# Patient Record
Sex: FEMALE | Race: WHITE | NOT HISPANIC OR LATINO | URBAN - METROPOLITAN AREA
[De-identification: names, ages, dates, MRNs, and addresses within clinical notes are randomized per-mention and may not be internally consistent; named-entity substitution may affect disease eponyms.]

---

## 2019-05-01 ENCOUNTER — OFFICE VISIT (OUTPATIENT)
Dept: UROLOGY | Facility: CLINIC | Age: 29
End: 2019-05-01
Attending: OBSTETRICS & GYNECOLOGY
Payer: COMMERCIAL

## 2019-05-01 VITALS — WEIGHT: 141.2 LBS | SYSTOLIC BLOOD PRESSURE: 125 MMHG | HEART RATE: 76 BPM | DIASTOLIC BLOOD PRESSURE: 89 MMHG

## 2019-05-01 DIAGNOSIS — N81.11 CYSTOCELE, MIDLINE: Primary | ICD-10-CM

## 2019-05-01 DIAGNOSIS — N81.6 RECTOCELE: ICD-10-CM

## 2019-05-01 DIAGNOSIS — N81.4 PROLAPSE OF UTERUS: ICD-10-CM

## 2019-05-01 RX ORDER — MV-MINS NO.50/IRON,CARB/FOLIC 29 MG-1 MG
1 TABLET ORAL DAILY
COMMUNITY
Start: 2018-06-15

## 2019-05-01 SDOH — HEALTH STABILITY: MENTAL HEALTH: HOW OFTEN DO YOU HAVE A DRINK CONTAINING ALCOHOL?: NEVER

## 2019-05-01 ASSESSMENT — PAIN SCALES - GENERAL: PAINLEVEL: NO PAIN (0)

## 2019-05-01 NOTE — PROGRESS NOTES
May 1, 2019    Referring Provider: MD LITO Zelaya [facility]  [address] [place] [address]    Primary Care Provider: System, Provider Not In    CC: post-partum prolapse    HPI:  Xin Ramirez is a 29 year old female who presents for evaluation of her pelvic floor symptoms.  She is seen at the request of Dr. Serena Rodriguez of [facility] for uterine prolapse. Se is 14 weeks post-partum from an  with a labilal laceration. No other tears or OB trauma. Son weighed 7 lbs. Shortly after delivery she noticed her vagina and her cervix were prolapse outside her vagina. Since that time she feels that it has reduced some what, although she still experiences vaginal bulge and pressure. Has been seeing PFPT. Denies AHSAN, UUI and FI.    Prolapse:  Do you feel a vaginal bulge? yes                                      Pressure? yes   Do you have to place your fingers in the vagina or in the rectum to have a bowel movement? no  Impact to quality of life? moderate     Stress Incontinence:  Do you leak urine with cough, sneeze, exercise? no  How often do you leak with cough, sneeze, exercise?  -  How much do you usually leak? -   Do you wear a pad? - If so; -  Impact to quality of life? -    Urge Incontinence:  Do you often get sudden urges to urinate? no  How often do have urges? -  If so, do you leak with these urges? -  How much do you usually leak? -  Impact to quality of life? -    Voids/day:5  Nocturia: 0-1  Fluid intake: 5  Caffeine: 1    Urinating:  Difficulty starting urination or strain to void? no  Weak or intermittent stream? no  Incomplete emptying or dribbling? no  Pain or burning with urination? no  Any blood in your urine? no    GI:  Constipation? no  Frequency stools daily    Straining for stools no  Stool consistency normal     Ever leak stool (Accidental Bowel Leakage)? no      If so, how often?               -      If so, do you leak?                   -      Soiling without sensation? -  History  of irritable bowel or Crohn's? no    Sexual/Pain:  Are you currently having sex?. yes  Pain with sex?   Most times   Sexual Partner: male  Do any of these symptoms interfere with sex? yes  Impact to quality of life? moderate    Prior therapy:  Ever done pelvic floor physical therapy? yes  Trial of medication? no  Have you ever tried a pessary? impressa    Medical History:  Do you have?   High Cholesterol? no     Diabetes? no  High Blood pressure? no     Recurrent UTIs? no  Sleep Apnea? no  Other medical problems: no    Surgical History:    Hysterectomy? no   Bladder Surgery? no   Other? appy     OB/Gyn History:  Pregnancies? 1  Deliveries? 1  Vaginal 1  Section 0  Current birth control? mirena  Periods? -  When was the first day of your last period? -  Last Pap smear? 2018 Any abnormal? no  Last mammogram? -  Last colonoscopy? -    Medications/Vitamins/Supplements: MVI    Drug Allergies: sulfa    Latex Allergy: no  Iodine Allergy no    Family History: (list relationship and age at diagnosis)  Breast cancer? no   Ovarian cancer? no   Colon cancer? no  Other? no    Social History:  Marital status:   Do you/ have you ever smoke(d)  cigarettes? no  Drink more than 1 alcoholic beverage a day?  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 no                   Unusual headaches? no  Leg pain w/ walking/exercise? no                       Easy bruising? no  Difficulty breathing w/ walking/exercise? no  Problems with vision? no  Dizziness, falls, or fainting? no  Excessive bleeding from cuts, gums, surgery? no  Other: no    No past medical history on file.    No past surgical history on file.    Social History     Socioeconomic History     Marital status:      Spouse name: Not on file     Number of children: Not on file     Years of education: Not on file     Highest education level: Not on file   Occupational History     Not on  file   Social Needs     Financial resource strain: Not on file     Food insecurity:     Worry: Not on file     Inability: Not on file     Transportation needs:     Medical: Not on file     Non-medical: Not on file   Tobacco Use     Smoking status: Not on file   Substance and Sexual Activity     Alcohol use: Not on file     Drug use: Not on file     Sexual activity: Not on file   Lifestyle     Physical activity:     Days per week: Not on file     Minutes per session: Not on file     Stress: Not on file   Relationships     Social connections:     Talks on phone: Not on file     Gets together: Not on file     Attends Tenriism service: Not on file     Active member of club or organization: Not on file     Attends meetings of clubs or organizations: Not on file     Relationship status: Not on file     Intimate partner violence:     Fear of current or ex partner: Not on file     Emotionally abused: Not on file     Physically abused: Not on file     Forced sexual activity: Not on file   Other Topics Concern     Not on file   Social History Narrative     Not on file       No family history on file.    ROS    Allergies not on file    No current outpatient medications on file.     No current facility-administered medications for this visit.        There were no vitals taken for this visit. No LMP recorded. There is no height or weight on file to calculate BMI.  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   Abdomen is soft, non-tender, non-distended, no CVAT.    Normal external female genitalia. The urethra was normal appearing, NT, no masses.    She has a cystocele to the HR, a rectocele to 1 cm above the hymenal ring and uterine prolapse to -4cm support on supine strain.  Speculum and bimanual exam are remarkable for normal appearing cervix and vaginal mucosa. IUD string visible, uterus and adnexa without masses, NT      3/5 kegels.    Rectal exam with normal tone, no masses or  tenderness.    POPQ  Aa 0   Ba 0  C -4  D -6  GH 4  PB 3  TVL 10  Ap -1  Bp -1    neg SST  VOID 100 ml  PVR 25 mL in and out cath  Urine dip ND    A/P: Xin Ramirez is a 29 year old F with post-partum prolapse    Long d/w patient regarding diagnosis, and treatment options. Recommend pessary fitting. Discussed activity level. Soul be okay to resume running once we fit her with a pessary. Discussed future child bearing. Discussed that there is little data regarding prolapse and her future pregnancies. Recommend that she attempt pregnancy when she and her  are ready. Feel that she can attempt a trial of labor.    A total of 60 minutes were spent with the patient today, > 50% in counseling and coordination of care    Devon Harmon MD  Professor, OB/GYN  Urogynecologist    CC  Patient Care Team:  System, Provider Not In as PCP - General (Clinic)  Tessa Nunez, PhD LP (Psychology)  Devon Harmon MD as MD (OB/Gyn)  BIANCA CONN

## 2019-05-01 NOTE — LETTER
RE: Xin Ramirez  [address] [facility] [address]     Dear Colleague,    Thank you for referring your patient, Xin Ramirez, to the [facility] at [facility]. Please see a copy of my visit note below.    May 1, 2019    Referring Provider: MD LITO Zelaya [facility]  [address] [place] [address]    Primary Care Provider: System, Provider Not In    CC: post-partum prolapse    HPI:  Xin Ramirez is a 29 year old female who presents for evaluation of her pelvic floor symptoms.  She is seen at the request of Dr. Serena Rodriguez of [facility] for uterine prolapse. Se is 14 weeks post-partum from an  with a labilal laceration. No other tears or OB trauma. Son weighed 7 lbs. Shortly after delivery she noticed her vagina and her cervix were prolapse outside her vagina. Since that time she feels that it has reduced some what, although she still experiences vaginal bulge and pressure. Has been seeing PFPT. Denies AHSAN, UUI and FI.    Prolapse:  Do you feel a vaginal bulge? yes                                      Pressure? yes   Do you have to place your fingers in the vagina or in the rectum to have a bowel movement? no  Impact to quality of life? moderate     Stress Incontinence:  Do you leak urine with cough, sneeze, exercise? no  How often do you leak with cough, sneeze, exercise?  -  How much do you usually leak? -   Do you wear a pad? - If so; -  Impact to quality of life? -    Urge Incontinence:  Do you often get sudden urges to urinate? no  How often do have urges? -  If so, do you leak with these urges? -  How much do you usually leak? -  Impact to quality of life? -    Voids/day:5  Nocturia: 0-1  Fluid intake: 5  Caffeine: 1    Urinating:  Difficulty starting urination or strain to void? no  Weak or intermittent stream? no  Incomplete emptying or dribbling? no  Pain or burning with urination? no  Any blood in your  urine? no    GI:  Constipation? no  Frequency stools daily    Straining for stools no  Stool consistency normal     Ever leak stool (Accidental Bowel Leakage)? no      If so, how often?               -      If so, do you leak?                   -      Soiling without sensation? -  History of irritable bowel or Crohn's? no    Sexual/Pain:  Are you currently having sex?. yes  Pain with sex?   Most times   Sexual Partner: male  Do any of these symptoms interfere with sex? yes  Impact to quality of life? moderate    Prior therapy:  Ever done pelvic floor physical therapy? yes  Trial of medication? no  Have you ever tried a pessary? impressa    Medical History:  Do you have?   High Cholesterol? no     Diabetes? no  High Blood pressure? no     Recurrent UTIs? no  Sleep Apnea? no  Other medical problems: no    Surgical History:    Hysterectomy? no   Bladder Surgery? no   Other? appy     OB/Gyn History:  Pregnancies? 1  Deliveries? 1  Vaginal 1  Section 0  Current birth control? mirena  Periods? -  When was the first day of your last period? -  Last Pap smear? 2018 Any abnormal? no  Last mammogram? -  Last colonoscopy? -    Medications/Vitamins/Supplements: MVI    Drug Allergies: sulfa    Latex Allergy: no  Iodine Allergy no    Family History: (list relationship and age at diagnosis)  Breast cancer? no   Ovarian cancer? no   Colon cancer? no  Other? no    Social History:  Marital status:   Do you/ have you ever smoke(d)  cigarettes? no  Drink more than 1 alcoholic beverage a day?  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 no                   Unusual headaches? no  Leg pain w/ walking/exercise? no                       Easy bruising? no  Difficulty breathing w/ walking/exercise? no  Problems with vision? no  Dizziness, falls, or fainting? no  Excessive bleeding from cuts, gums, surgery? no  Other: no    No past medical history on  file.    No past surgical history on file.    Social History     Socioeconomic History     Marital status:      Spouse name: Not on file     Number of children: Not on file     Years of education: Not on file     Highest education level: Not on file   Occupational History     Not on file   Social Needs     Financial resource strain: Not on file     Food insecurity:     Worry: Not on file     Inability: Not on file     Transportation needs:     Medical: Not on file     Non-medical: Not on file   Tobacco Use     Smoking status: Not on file   Substance and Sexual Activity     Alcohol use: Not on file     Drug use: Not on file     Sexual activity: Not on file   Lifestyle     Physical activity:     Days per week: Not on file     Minutes per session: Not on file     Stress: Not on file   Relationships     Social connections:     Talks on phone: Not on file     Gets together: Not on file     Attends Orthodoxy service: Not on file     Active member of club or organization: Not on file     Attends meetings of clubs or organizations: Not on file     Relationship status: Not on file     Intimate partner violence:     Fear of current or ex partner: Not on file     Emotionally abused: Not on file     Physically abused: Not on file     Forced sexual activity: Not on file   Other Topics Concern     Not on file   Social History Narrative     Not on file     No family history on file.    Allergies not on file    No current outpatient medications on file.     No current facility-administered medications for this visit.        There were no vitals taken for this visit. No LMP recorded. There is no height or weight on file to calculate BMI.  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   Abdomen is soft, non-tender, non-distended, no CVAT.    Normal external female genitalia. The urethra was normal appearing, NT, no masses.    She has a cystocele to the HR, a rectocele to 1 cm above the hymenal  ring and uterine prolapse to -4cm support on supine strain.  Speculum and bimanual exam are remarkable for normal appearing cervix and vaginal mucosa. IUD string visible, uterus and adnexa without masses, NT      3/5 kegels.    Rectal exam with normal tone, no masses or tenderness.    POPQ  Aa 0   Ba 0  C -4  D -6  GH 4  PB 3  TVL 10  Ap -1  Bp -1    neg SST  VOID 100 ml  PVR 25 mL in and out cath  Urine dip ND    A/P: Xin Ramirez is a 29 year old F with post-partum prolapse    Long d/w patient regarding diagnosis, and treatment options. Recommend pessary fitting. Discussed activity level. Soul be okay to resume running once we fit her with a pessary. Discussed future child bearing. Discussed that there is little data regarding prolapse and her future pregnancies. Recommend that she attempt pregnancy when she and her  are ready. Feel that she can attempt a trial of labor.    A total of 60 minutes were spent with the patient today, > 50% in counseling and coordination of care    Devon Harmon MD  Professor, OB/GYN  Urogynecologist    CC  Patient Care Team:  System, Provider Not In as PCP - General (Clinic)  Tessa Nunez, PhD LP (Psychology)  Devon Harmon MD as MD (OB/Gyn)  BIANCA CONN

## 2019-05-02 ENCOUNTER — OFFICE VISIT (OUTPATIENT)
Dept: UROLOGY | Facility: CLINIC | Age: 29
End: 2019-05-02
Attending: OBSTETRICS & GYNECOLOGY
Payer: COMMERCIAL

## 2019-05-02 VITALS — DIASTOLIC BLOOD PRESSURE: 84 MMHG | SYSTOLIC BLOOD PRESSURE: 125 MMHG | WEIGHT: 141 LBS

## 2019-05-02 DIAGNOSIS — N81.6 RECTOCELE: ICD-10-CM

## 2019-05-02 DIAGNOSIS — N81.11 CYSTOCELE, MIDLINE: Primary | ICD-10-CM

## 2019-05-02 DIAGNOSIS — N81.4 PROLAPSE OF UTERUS: ICD-10-CM

## 2019-05-02 PROCEDURE — 57160 INSERT PESSARY/OTHER DEVICE: CPT | Mod: ZF | Performed by: OBSTETRICS & GYNECOLOGY

## 2019-05-02 PROCEDURE — G0463 HOSPITAL OUTPT CLINIC VISIT: HCPCS | Mod: ZF

## 2019-05-02 ASSESSMENT — PAIN SCALES - GENERAL: PAINLEVEL: NO PAIN (0)

## 2019-05-02 NOTE — LETTER
RE: Xin Ramirez  [address] [place] [id number]     Dear Colleague,    Thank you for referring your patient, Xin Ramirez, to the [facility] at [facility]. Please see a copy of my visit note below.    May 2, 2019    Return visit    30 yo F with post partum prolapse seen in clinic yesterday. Patient returns today for pessary fitting.    There were no vitals taken for this visit.  She is comfortable, in no distress, non-labored breathing.  Abdomen is soft, non-tender, non-distended.  Normal external female genitalia.  Speculum and bimanual exam are remarkable for mild vaginal atrophy and the previously noted prolapse.    Pt was fit with a #5 ring with support. Demonstrated ability to insert and remove without difficulty. Advised to insert and remove pessary daily. F/U in 2 weeks or sooner PRN bleeding, discharge or pain.    A/P: 29 year old F with cystocele, rectocele and uterine prolapse. Fit with #5 ring with support    Will also give compounded estrogen cream for mild vaginal atrophy    A total of 25 minutes were spent with the patient today, > 50% in counseling and coordination of care    Devon Harmon MD  Professor, OB/GYN  Urogynecologist    CC  Patient Care Team:  System, Provider Not In as PCP - General (Clinic)  Tessa Nunez, PhD LP (Psychology)  Devon Harmon MD as MD (OB/Gyn)  SELF, REFERRED

## 2019-05-02 NOTE — PROGRESS NOTES
May 2, 2019    Return visit    30 yo F with post partum prolapse seen in clinic yesterday. Patient returns today for pessary fitting.    There were no vitals taken for this visit.  She is comfortable, in no distress, non-labored breathing.  Abdomen is soft, non-tender, non-distended.  Normal external female genitalia.  Speculum and bimanual exam are remarkable for mild vaginal atrophy and the previously noted prolapse.    Pt was fit with a #5 ring with support. Demonstrated ability to insert and remove without difficulty. Advised to insert and remove pessary daily. F/U in 2 weeks or sooner PRN bleeding, discharge or pain.    A/P: 29 year old F with cystocele, rectocele and uterine prolapse. Fit with #5 ring with support    Will also give compounded estrogen cream for mild vaginal atrophy    A total of 25 minutes were spent with the patient today, > 50% in counseling and coordination of care    Devon Harmon MD  Professor, OB/GYN  Urogynecologist      CC  Patient Care Team:  System, Provider Not In as PCP - General (Clinic)  Tessa Nunez, PhD LP (Psychology)  Devon Harmon MD as MD (OB/Gyn)  SELF, REFERRED

## 2019-05-23 ENCOUNTER — OFFICE VISIT (OUTPATIENT)
Dept: UROLOGY | Facility: CLINIC | Age: 29
End: 2019-05-23
Attending: OBSTETRICS & GYNECOLOGY
Payer: COMMERCIAL

## 2019-05-23 VITALS — WEIGHT: 141 LBS | SYSTOLIC BLOOD PRESSURE: 118 MMHG | HEART RATE: 65 BPM | DIASTOLIC BLOOD PRESSURE: 80 MMHG

## 2019-05-23 DIAGNOSIS — N81.11 CYSTOCELE, MIDLINE: Primary | ICD-10-CM

## 2019-05-23 DIAGNOSIS — N89.8 VAGINAL DISCHARGE: ICD-10-CM

## 2019-05-23 LAB
CLUE CELLS: NORMAL
TRICHOMONAS (WET PREP): NORMAL
YEAST (WET PREP): NORMAL

## 2019-05-23 PROCEDURE — 87210 SMEAR WET MOUNT SALINE/INK: CPT | Mod: ZF | Performed by: OBSTETRICS & GYNECOLOGY

## 2019-05-23 PROCEDURE — G0463 HOSPITAL OUTPT CLINIC VISIT: HCPCS | Mod: 25

## 2019-05-23 PROCEDURE — 57160 INSERT PESSARY/OTHER DEVICE: CPT | Mod: ZF | Performed by: OBSTETRICS & GYNECOLOGY

## 2019-05-23 ASSESSMENT — PAIN SCALES - GENERAL: PAINLEVEL: NO PAIN (0)

## 2019-05-23 NOTE — LETTER
5/23/2019     RE: Xin Ramirez  [address] [facility] [address]     Dear Colleague,    Thank you for referring your patient, Xin Ramirez, to the [facility] at [facility]. Please see a copy of my visit note below.    CC: prolapse    Pt here for pessary check. Pt inserting and removing pessary daily most times. She will occ leave the pessary out on weekends when she is not working. She noted that she does feel increased pelvic pressure and discomfort when she does not wear the pessary. She does complain that the pessary will occ pinch and cause discomfort. Pt o/w happy with pessary, but was wondering if she could try a smaller size. Denies bleeding, spotting or discharge.    /80   Pulse 65   Wt 64 kg (141 lb)  No LMP recorded. (Menstrual status: IUD). There is no height or weight on file to calculate BMI.  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   Vagina: has a small amount of discharge, but o/w without lesion.    A/P: Xin Ramirez is a 29 year old F with cystocele    We fit her with a #4 ring with support today.    A total of 30 minutes were spent with the patient today, > 50% in counseling and coordination of care    Devon Harmon MD  Professor, OB/GYN  Urogynecologist  CC  Patient Care Team:  System, Provider Not In as PCP - General (Clinic)  Tessa Nunez, PhD LP (Psychology)  Devon Harmon MD as MD (OB/Gyn)  SELF, REFERRED

## 2019-05-23 NOTE — NURSING NOTE
Chief Complaint   Patient presents with     RECHECK     pesasry check   improving a little   Going to PT .     A little pain and thick lumpy discharge  Usually white.  #5 ring with support.

## 2019-05-23 NOTE — PROGRESS NOTES
CC: prolapse    Pt here for pessary check. Pt inserting and removing pessary daily most times. She will occ leave the pessary out on weekends when she is not working. She noted that she does feel increased pelvic pressure and discomfort when she does not wear the pessary. She does complain that the pessary will occ pinch and cause discomfort. Pt o/w happy with pessary, but was wondering if she could try a smaller size. Denies bleeding, spotting or discharge.    /80   Pulse 65   Wt 64 kg (141 lb)  No LMP recorded. (Menstrual status: IUD). There is no height or weight on file to calculate BMI.  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   Vagina: has a small amount of discharge, but o/w without lesion.    A/P: Xin Ramirez is a 29 year old F with cystocele    We fit her with a #4 ring with support today.    A total of 30 minutes were spent with the patient today, > 50% in counseling and coordination of care    Devon Harmon MD  Professor, OB/GYN  Urogynecologist  CC  Patient Care Team:  System, Provider Not In as PCP - General (Clinic)  Tessa Nunez, PhD LP (Psychology)  Devon Harmon MD as MD (OB/Gyn)  SELF, REFERRED

## 2019-11-27 ENCOUNTER — OFFICE VISIT (OUTPATIENT)
Dept: UROLOGY | Facility: CLINIC | Age: 29
End: 2019-11-27
Attending: OBSTETRICS & GYNECOLOGY
Payer: COMMERCIAL

## 2019-11-27 VITALS — DIASTOLIC BLOOD PRESSURE: 75 MMHG | HEART RATE: 77 BPM | WEIGHT: 127.7 LBS | SYSTOLIC BLOOD PRESSURE: 112 MMHG

## 2019-11-27 DIAGNOSIS — N81.11 CYSTOCELE, MIDLINE: Primary | ICD-10-CM

## 2019-11-27 DIAGNOSIS — N81.6 RECTOCELE: ICD-10-CM

## 2019-11-27 PROCEDURE — G0463 HOSPITAL OUTPT CLINIC VISIT: HCPCS | Mod: ZF

## 2019-11-27 ASSESSMENT — PAIN SCALES - GENERAL: PAINLEVEL: NO PAIN (0)

## 2019-11-27 NOTE — PROGRESS NOTES
2019    Return visit    Patient returns today for f/u of pessary use. She has been using her pessary intermittently since her last visit as she is mostly asymptomatic. Only occ pressure. No problems with intercourse. Has only occ attempted to run, but no symptoms with that either    /75   Pulse 77   Wt 57.9 kg (127 lb 11.2 oz)   She is comfortable, in no distress, non-labored breathing.  Abdomen is soft, non-tender, non-distended.  Normal external female genitalia.  Speculum and bimanual exam are remarkable for cystocele to 0 and rectocele to -1. Uterus well supported.      A/P: 29 year old F with cystocele    Pt advised to continue to us pessary as needed. No need to f/u with me unless she has questions or worsening of her prolapse. Would allow attempted  if pregnant in future.    A total of 15 minutes were spent with the patient today, > 50% in counseling and coordination of care    Devon Harmon MD  Professor, OB/GYN  Urogynecologist    CC  Patient Care Team:  Tessa Nunez, PhD LP (Psychology)  Devon Harmon MD as MD (OB/Gyn)  SELF, REFERRED

## 2019-11-27 NOTE — LETTER
2019       RE: Xin Ramirez  [address] [facility] [address]     Dear Colleague,    Thank you for referring your patient, Xin Ramirez, to the [facility] at [facility]. Please see a copy of my visit note below.    2019    Return visit    Patient returns today for f/u of pessary use. She has been using her pessary intermittently since her last visit as she is mostly asymptomatic. Only occ pressure. No problems with intercourse. Has only occ attempted to run, but no symptoms with that either    /75   Pulse 77   Wt 57.9 kg (127 lb 11.2 oz)   She is comfortable, in no distress, non-labored breathing.  Abdomen is soft, non-tender, non-distended.  Normal external female genitalia.  Speculum and bimanual exam are remarkable for cystocele to 0 and rectocele to -1. Uterus well supported.      A/P: 29 year old F with cystocele    Pt advised to continue to us pessary as needed. No need to f/u with me unless she has questions or worsening of her prolapse. Would allow attempted  if pregnant in future.    A total of 15 minutes were spent with the patient today, > 50% in counseling and coordination of care    Devon Harmon MD  Professor, OB/GYN  Urogynecologist    CC  Patient Care Team:  Tessa Nunez, PhD LP (Psychology)  Devon Harmon MD as MD (OB/Gyn)  SELF, REFERRED      Again, thank you for allowing me to participate in the care of your patient.      Sincerely,    Devon Harmon MD

## 2019-11-27 NOTE — LETTER
2019      RE: Xin Ramirez  [address] [facility] [address]       2019    Return visit    Patient returns today for f/u of pessary use. She has been using her pessary intermittently since her last visit as she is mostly asymptomatic. Only occ pressure. No problems with intercourse. Has only occ attempted to run, but no symptoms with that either    /75   Pulse 77   Wt 57.9 kg (127 lb 11.2 oz)   She is comfortable, in no distress, non-labored breathing.  Abdomen is soft, non-tender, non-distended.  Normal external female genitalia.  Speculum and bimanual exam are remarkable for cystocele to 0 and rectocele to -1. Uterus well supported.      A/P: 29 year old F with cystocele    Pt advised to continue to us pessary as needed. No need to f/u with me unless she has questions or worsening of her prolapse. Would allow attempted  if pregnant in future.    A total of 15 minutes were spent with the patient today, > 50% in counseling and coordination of care    Devon Harmon MD  Professor, OB/GYN  Urogynecologist    CC  Patient Care Team:  Tessa Nunez, PhD LP (Psychology)  Devon Harmon MD as MD (OB/Gyn)  SELF, REFERRED

## 2020-10-03 ENCOUNTER — AMBULATORY - HEALTHEAST (OUTPATIENT)
Dept: INTERNAL MEDICINE | Facility: CLINIC | Age: 30
End: 2020-10-03

## 2020-10-03 ENCOUNTER — VIRTUAL VISIT (OUTPATIENT)
Dept: FAMILY MEDICINE | Facility: OTHER | Age: 30
End: 2020-10-03
Payer: COMMERCIAL

## 2020-10-03 ENCOUNTER — AMBULATORY - HEALTHEAST (OUTPATIENT)
Dept: FAMILY MEDICINE | Facility: CLINIC | Age: 30
End: 2020-10-03

## 2020-10-03 DIAGNOSIS — Z20.822 SUSPECTED 2019 NOVEL CORONAVIRUS INFECTION: ICD-10-CM

## 2020-10-03 PROCEDURE — 99421 OL DIG E/M SVC 5-10 MIN: CPT | Performed by: FAMILY MEDICINE

## 2020-10-03 NOTE — PROGRESS NOTES
"Date: 10/03/2020 09:11:01  Clinician: Brice Nicole  Clinician NPI: [id number]  Patient: Xin Ramirez  Patient : 1990  Patient Address: [address] [place] [id number]  Patient Phone: [phone]  Visit Protocol: URI  Patient Summary:  Xin is a 30 year old ( : 1990 ) female who initiated a [facility] Visit for COVID-19 (Coronavirus) evaluation and screening. When asked the question \"Please sign me up to receive news, health information and promotions from [facility].\", Xin responded \"No\".    Xin states her symptoms started gradually 3-4 days ago.   Her symptoms consist of enlarged lymph nodes, a cough, nasal congestion, rhinitis, nausea, facial pain or pressure, myalgia, malaise, and a sore throat.   Symptom details     Nasal secretions: The color of her mucus is clear and yellow.    Cough: Xin coughs a few times an hour and her cough is more bothersome at night. Phlegm comes into her throat when she coughs. She believes her cough is caused by post-nasal drip. The color of the phlegm is clear and yellow.     Sore throat: Xin reports having mild throat pain (1-3 on a 10 point pain scale), does not have exudate on her tonsils, and can swallow liquids. The lymph nodes in her neck are enlarged. A rash has not appeared on the skin since the sore throat started.     Facial pain or pressure: The facial pain or pressure does not feel worse when bending or leaning forward.      Xin denies having ear pain, headache, wheezing, fever, anosmia, vomiting, chills, teeth pain, ageusia, and diarrhea. She also denies double sickening (worsening symptoms after initial improvement), having a sinus infection within the past year, taking antibiotic medication in the past month, and having recent facial or sinus surgery in the past 60 days. She is not experiencing dyspnea.   Precipitating events  Within the past week, Xin has not been exposed to someone with strep throat. She has not recently been exposed to " someone with influenza. Xin has been in close contact with the following high risk individuals: children under the age of 5.   Pertinent COVID-19 (Coronavirus) information  In the past 14 days, Xin has not worked in a congregate living setting.   She either works or volunteers as a healthcare worker or a , or works or volunteers in a healthcare facility. She provides direct patient care. Additional job details as reported by the patient (free text): Resident physician working in pediatrics at [employer]   Xin also has not lived in a congregate living setting in the past 14 days. She lives with a healthcare worker.   Xin has not had a close contact with a laboratory-confirmed COVID-19 patient within 14 days of symptom onset.   Since December 2019, Xin and has had upper respiratory infection (URI) or influenza-like illness. Has not been diagnosed with lab-confirmed COVID-19 test      Date(s) of previous URI or influenza-like illness (free-text): January 2020     Symptoms Xin experienced during previous URI or influenza-like illness as reported by the patient (free-text): Cough        Pertinent medical history  Xin does not get yeast infections when she takes antibiotics.   Xin needs a return to work/school note.   Weight: 130 lbs   Xin does not smoke or use smokeless tobacco.   She denies pregnancy and is breastfeeding. She does not menstruate.   Additional information as reported by the patient (free text): Toddler had cold symptoms the week prior and tested negative for COVID   Weight: 130 lbs    MEDICATIONS: No current medications, ALLERGIES: Sulfa (Sulfonamide Antibiotics)  Clinician Response:  Dear Xin,   Your symptoms show that you may have coronavirus (COVID-19). This illness can cause fever, cough and trouble breathing. Many people get a mild case and get better on their own. Some people can get very sick.  What should I do?  We would like to test you for this  "virus.   1. Please call [phone] to schedule your visit. Explain that you were [facility] to have a COVID-19 test. Be ready to share [facility].  The following will serve as your written order for this COVID Test, ordered by me, for the indication of suspected COVID [Z20.828]: The test will be ordered in [employer], our electronic health record, after you are scheduled. It will show as ordered and authorized by Keo Felton MD.  Order: COVID-19 (Coronavirus) PCR for SYMPTOMATIC testing from [facility].      2. When it's time for your COVID test:  Stay at least 6 feet away from others. (If someone will drive you to your test, stay in the backseat, as far away from the  as you can.)   Cover your mouth and nose with a mask, tissue or washcloth.  Go straight to the testing site. Don't make any stops on the way there or back.      3.Starting now: Stay home and away from others (self-isolate) until:   You've had no fever---and no medicine that reduces fever---for one full day (24 hours). And...   Your other symptoms have gotten better. For example, your cough or breathing has improved. And...   At least 10 days have passed since your symptoms started.       During this time, don't leave the house except for testing or medical care.   Stay in your own room, even for meals. Use your own bathroom if you can.   Stay away from others in your home. No hugging, kissing or shaking hands. No visitors.  Don't go to work, school or anywhere else.    Clean \"high touch\" surfaces often (doorknobs, counters, handles, etc.). Use a household cleaning spray or wipes. You'll find a full list of  on the EPA website: www.epa.gov/pesticide-registration/list-n-disinfectants-use-against-sars-cov-2.   Cover your mouth and nose with a mask, tissue or washcloth to avoid spreading germs.  Wash your hands and face often. Use soap and water.  Caregivers in these groups are at risk for severe illness due to COVID-19:  o " People 65 years and older  o People who live in a nursing home or long-term care facility  o People with chronic disease (lung, heart, cancer, diabetes, kidney, liver, immunologic)  o People who have a weakened immune system, including those who:   Are in cancer treatment  Take medicine that weakens the immune system, such as corticosteroids  Had a bone marrow or organ transplant  Have an immune deficiency  Have poorly controlled HIV or AIDS  Are obese (body mass index of 40 or higher)  Smoke regularly   o Caregivers should wear gloves while washing dishes, handling laundry and cleaning bedrooms and bathrooms.  o Use caution when washing and drying laundry: Don't shake dirty laundry, and use the warmest water setting that you can.  o For more tips, go to www.cdc.gov/coronavirus/2019-ncov/downloads/10Things.pdf.    4.Sign up for [email or web address]. We know it's scary to hear that you might have COVID-19. We want to track your symptoms to make sure you're okay over the next 2 weeks. Please look for an email from [email or web address]---this is a free, online program that we'll use to keep in touch. To sign up, follow the link in the email. Learn more at [email or web address]  How can I take care of myself?   Get lots of rest. Drink extra fluids (unless a doctor has told you not to).   Take Tylenol (acetaminophen) for fever or pain. If you have liver or kidney problems, ask your family doctor if it's okay to take Tylenol.   Adults can take either:    650 mg (two 325 mg pills) every 4 to 6 hours, or...   1,000 mg (two 500 mg pills) every 8 hours as needed.    Note: Don't take more than 3,000 mg in one day. Acetaminophen is found in many medicines (both prescribed and over-the-counter medicines). Read all labels to be sure you don't take too much.   For children, check the Tylenol bottle for the right dose. The dose is based on the child's age or weight.    If you have other health problems (like cancer, heart failure, an  organ transplant or severe kidney disease): Call your specialty clinic if you don't feel better in the next 2 days.       Know when to call 911. Emergency warning signs include:    Trouble breathing or shortness of breath Pain or pressure in the chest that doesn't go away Feeling confused like you haven't felt before, or not being able to wake up Bluish-colored lips or face.  Where can I get more information?   [facility] -- About COVID-19: [email or web address]   CDC -- What to Do If You're Sick: www.cdc.gov/coronavirus/2019-ncov/about/steps-when-sick.html   CDC -- Ending Home Isolation: www.cdc.gov/coronavirus/2019-ncov/hcp/disposition-in-home-patients.html   CDC -- Caring for Someone: www.cdc.gov/coronavirus/2019-ncov/if-you-are-sick/care-for-someone.html   [facility] -- Interim Guidance for Hospital Discharge to Home: www.[facility].mn./diseases/coronavirus/hcp/hospdischarge.pdf   [facility] clinical trials (COVID-19 research studies): clinicalaffairs.[facility]-clinical-trials    Below are the COVID-19 hotlines at the [facility] ([facility]). Interpreters are available.    For health questions: Call 970-691-6716 or 1-175.229.7518 (7 a.m. to 7 p.m.) For questions about schools and childcare: Call 698-645-0988 or 1-373.641.7482 (7 a.m. to 7 p.m.)    Diagnosis: Other malaise  Diagnosis ICD: R53.81

## 2020-10-05 ENCOUNTER — COMMUNICATION - HEALTHEAST (OUTPATIENT)
Dept: SCHEDULING | Facility: CLINIC | Age: 30
End: 2020-10-05

## 2021-08-08 ENCOUNTER — HEALTH MAINTENANCE LETTER (OUTPATIENT)
Age: 31
End: 2021-08-08

## 2021-10-03 ENCOUNTER — HEALTH MAINTENANCE LETTER (OUTPATIENT)
Age: 31
End: 2021-10-03

## 2022-09-11 ENCOUNTER — HEALTH MAINTENANCE LETTER (OUTPATIENT)
Age: 32
End: 2022-09-11

## 2022-10-06 ENCOUNTER — HOSPITAL ENCOUNTER (OUTPATIENT)
Facility: CLINIC | Age: 32
Discharge: HOME | End: 2022-10-06
Attending: OBSTETRICS & GYNECOLOGY | Admitting: OBSTETRICS & GYNECOLOGY
Payer: COMMERCIAL

## 2022-10-06 ENCOUNTER — HOSPITAL ENCOUNTER (OUTPATIENT)
Facility: CLINIC | Age: 32
End: 2022-10-06
Admitting: OBSTETRICS & GYNECOLOGY
Payer: COMMERCIAL

## 2022-10-06 VITALS
HEIGHT: 67 IN | WEIGHT: 160 LBS | DIASTOLIC BLOOD PRESSURE: 68 MMHG | BODY MASS INDEX: 25.11 KG/M2 | RESPIRATION RATE: 16 BRPM | SYSTOLIC BLOOD PRESSURE: 112 MMHG | TEMPERATURE: 98 F | HEART RATE: 74 BPM

## 2022-10-06 LAB
ALBUMIN UR-MCNC: NEGATIVE MG/DL
APPEARANCE UR: CLEAR
BILIRUB UR QL STRIP: NEGATIVE
CLUE CELLS: ABNORMAL
COLOR UR AUTO: NORMAL
FFN SPECIMEN INTEGRITY: NORMAL
FIBRONECTIN FETAL VAG QL: NEGATIVE
GLUCOSE UR STRIP-MCNC: NEGATIVE MG/DL
HGB UR QL STRIP: NEGATIVE
KETONES UR STRIP-MCNC: NEGATIVE MG/DL
LEUKOCYTE ESTERASE UR QL STRIP: NEGATIVE
NITRATE UR QL: NEGATIVE
PH UR STRIP: 6.5 [PH] (ref 5–7)
SP GR UR STRIP: 1.01 (ref 1–1.03)
TRICHOMONAS, WET PREP: ABNORMAL
UROBILINOGEN UR STRIP-MCNC: NORMAL MG/DL
WBC'S/HIGH POWER FIELD, WET PREP: ABNORMAL
YEAST, WET PREP: ABNORMAL

## 2022-10-06 PROCEDURE — 87210 SMEAR WET MOUNT SALINE/INK: CPT

## 2022-10-06 PROCEDURE — 82731 ASSAY OF FETAL FIBRONECTIN: CPT

## 2022-10-06 PROCEDURE — 81003 URINALYSIS AUTO W/O SCOPE: CPT

## 2022-10-06 PROCEDURE — 99214 OFFICE O/P EST MOD 30 MIN: CPT | Mod: 25 | Performed by: OBSTETRICS & GYNECOLOGY

## 2022-10-06 PROCEDURE — 59025 FETAL NON-STRESS TEST: CPT | Mod: 26 | Performed by: OBSTETRICS & GYNECOLOGY

## 2022-10-06 PROCEDURE — G0463 HOSPITAL OUTPT CLINIC VISIT: HCPCS

## 2022-10-06 PROCEDURE — 87591 N.GONORRHOEAE DNA AMP PROB: CPT

## 2022-10-06 PROCEDURE — 87491 CHLMYD TRACH DNA AMP PROBE: CPT

## 2022-10-06 RX ORDER — LIDOCAINE 40 MG/G
CREAM TOPICAL
Status: DISCONTINUED | OUTPATIENT
Start: 2022-10-06 | End: 2022-10-06 | Stop reason: HOSPADM

## 2022-10-06 ASSESSMENT — ACTIVITIES OF DAILY LIVING (ADL)
ADLS_ACUITY_SCORE: 20

## 2022-10-06 NOTE — PLAN OF CARE
No cervical change and pt ready for discharge. Has appointment with primary provider tomorrow. Discharge instructions given. Pt discharged to home and self care.

## 2022-10-06 NOTE — PROVIDER NOTIFICATION
Dr Angel to bedside for SSE at 0850.  Swabs collected by MD FREDY reports cervix visually closed.  Pt given button to raúl her UCs at 0940 since we haven't seen many on toco.  Plan per Dr Angel to reasses in 1-2 hours.  Report given to ISABELLA Castro RN at 1005.

## 2022-10-06 NOTE — PROGRESS NOTES
"[facility]  Labor & Delivery Triage Note    HPI: Xin Ramirez is a 32 year old  at 26w3d, here for rule out of  labor.    Over approximately the past week, Xin endorses having felt occasional sensations of \"tightness\" across the upper portion of her abdomen bilaterally. In the last few days leading up to presentation, she noticed this sensation especially when driving home from work, however, it would usually resolve by the following morning. Last night (10/6), she noticed that the tightness sensation was not resolving, and it continued into this morning. Today, she endorses feeling this sensation approximately every 5 minutes.     Pregnancy notable for:  - large subchorionic hematoma at 12w US, followed by large vaginal bleed  - single umbilical artery  - Hx cystocele after last delivery, repaired    She states that today she is continuing to feel these sensations of \"tightness\" across her upper abdomen that are becoming more frequent.   She has good fetal movement. Denies having any vaginal bleeding or leakage of fluids. No recent fall or trauma. No recent sexual intercourse.She denies fever, chills, HA, CP, SOB, nausea, vomiting.       OBHX:  OB History    Para Term  AB Living   3 1 1 0 1 1   SAB IAB Ectopic Multiple Live Births   0 0 0 0 1      # Outcome Date GA Lbr Jhony/2nd Weight Sex Delivery Anes PTL Lv   3 Current            2 AB            1 Term     M    MICHAEL       Medical Hx:  Past Medical History:   Diagnosis Date     Seasonal allergies        Surgical Hx:  Past Surgical History:   Procedure Laterality Date     APPENDECTOMY       tonsillectomy         Medications:  Current Outpatient Medications   Medication Instructions     doxylamine (UNISOM) 25 mg, Oral, AT BEDTIME     ESTRIOL 1MG/GRAM CREAM 1 g, TWICE WEEKLY, Apply one gram vaginally every night for two weeks then  Use twice weekly thereafter      levonorgestrel " "(MIRENA, 52 MG,) 20 MCG/24HR IUD 1 Device, Intrauterine, DAILY     Prenatal Vit-Iron Carbonyl-FA (VOL-TAB RX) 29-1 MG TABS 1 tablet, Oral, DAILY       Allergies:  Allergies   Allergen Reactions     Sulfa Drugs Hives       FMHx:    Family History   Problem Relation Age of Onset     Hyperlipidemia Mother      Stomach Cancer Maternal Grandmother      Skin Cancer Maternal Grandfather      Lung Cancer Paternal Grandfather        Social Hx:  NICU Fellow, 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    Physical Exam:  Vitals:    10/06/22 0800 10/06/22 0808   BP: 116/76    Resp: 16    Temp: 98.5  F (36.9  C)    TempSrc: Oral    Weight:  72.6 kg (160 lb)   Height:  1.702 m (5' 7\")     GEN: resting comfortably in bed, NAD   CV: RRR, S1+/S2+, no murmur, rubs, clicks, gallops appreciated. well perfused  PULM: CTA b/l On room air, no increased work of breathing  ABD: soft, gravid, non-tender to palpation, non-distended  EXT: no edema appreicated  SSE: Cervix appears closed, white physiologic discharge.    NST:  FHT: baseline 150, moderate variability, + accels, no decels  TOCO: 0 ctx in ten minutes    A/P: 32 year old  at 26w3d, here for r/o of  labor. Pregnancy notable for large subchorionic hematoma at 12w US, followed by large vaginal bleed and single umbilical artery, Hx of cystocele after last delivery, now repaired. Contractions began a few days ago. Felt this morning while at work, more persistent prompting presentation here today. [place] care at [facility]. Request for records made.     # Rule out  Labor:   - Cervix: c/l/h (1145)  - Membranes: in tact  - Labs: UA and wet prep negative. Gc/Chlam pending  - Given patient continues to report regular spacing of tightening's/contractions, will plan " for prolonged monitoring in triage area. Will recheck and plan for discharge home if no cervical change. Patient care signed out to afternoon resident.     # Fetal Well-Being  - Continuous Monitoring  - FHT: Category I, appropriate for gestational age    # PNC  - Rh, GBS awaiting release of records.        Brice Mondragon, MS3  [facility]    Resident/Fellow Attestation   I, Yoselin Angel, was present with the medical student who participated in the service and in the documentation of the note.  I have verified the history and personally performed the physical exam and medical decision making.  I agree with the assessment and plan of care as documented in the note.  I have reviewed the note and made changes as necessary.    Yoselin Angel DO, MS  Obstetrics, Gynecology & Women's Health   Resident, PGY-2  10/06/2022 11:56 AM    I have seen and examined the patient with the resident. I have reviewed, edited, and agree with the note.   My findings are: appears mildly distressed.   Temp: 98.5  F (36.9  C) Temp src: Oral BP: 116/76     Resp: 16        Gravid, fundus nontender. S<D   EFM reassuring and category I   TOCO none but patient manually marking q1-5 mins.    A/P:  contractions   Will perform fFN and digital exam  Recheck in 4 hours  Consider discharge home if fFN negative and no cervical change    Layla Dumont MD      Addendum: fFN negative.   Will reassess shortly and determine disposition  Layla Dumont MD

## 2022-10-06 NOTE — PROVIDER NOTIFICATION
Dr Angel notified via electronic page pt here for PTL eval.  , 26.3.  Feeling UCs every 5 minutes this morning, none on toco so far, no bleeding or leaking.  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

## 2022-10-06 NOTE — PROGRESS NOTES
Brief OB Progress Note:    In to evaluate patient after observation today for  labor evaluation. Cervix closed this morning. At this time, patient report some continued mild cramping but hard to tell. Repeat SVE now with cervix still closed. Discussed reassured that not PTL at this time. Encouraged continued rest, oral hydration, and nutrition. [place] [facility] Discussed return precautions. Safe for discharge home.    Discussed with Dr. Chandni Wise MD  ObGyn, PGY-3  10/06/2022 6:22 PM

## 2022-10-06 NOTE — DISCHARGE INSTRUCTIONS
Discharge Instruction for Undelivered Patients      You were seen for: Labor Assessment  We Consulted: Dr. Dumont, Dr. Wise  You had (Test or Medicine): labs, uterine and fetal monitoring     Diet:   Drink 8 to 12 glasses of liquids (milk, juice, water) every day.  You may eat meals and snacks.     Activity:  Call your doctor or nurse midwife if your baby is moving less than usual.     Call your provider if you notice:  Swelling in your face or increased swelling in your hands or legs.  Headaches that are not relieved by Tylenol (acetaminophen).  Changes in your vision (blurring: seeing spots or stars.)  Nausea (sick to your stomach) and vomiting (throwing up).   Weight gain of 5 pounds or more per week.  Heartburn that doesn't go away.  Signs of bladder infection: pain when you urinate (use the toilet), need to go more often and more urgently.  The bag of gutierrez (rupture of membranes) breaks, or you notice leaking in your underwear.  Bright red blood in your underwear.  Abdominal (lower belly) or stomach pain.  For first baby: Contractions (tightening) less than 5 minutes apart for one hour or more.  Second (plus) baby: Contractions (tightening) less than 10 minutes apart and getting stronger.  *If less than 34 weeks: Contractions (tightening) more than 6 times in one hour.  Increase or change in vaginal discharge (note the color and amount)  Other:     Follow-up:  As scheduled in the clinic

## 2022-10-07 LAB
C TRACH DNA SPEC QL PROBE+SIG AMP: NEGATIVE
N GONORRHOEA DNA SPEC QL NAA+PROBE: NEGATIVE

## 2022-11-17 ENCOUNTER — HOSPITAL ENCOUNTER (OUTPATIENT)
Facility: CLINIC | Age: 32
Discharge: HOME | End: 2022-11-17
Attending: OBSTETRICS & GYNECOLOGY | Admitting: OBSTETRICS & GYNECOLOGY
Payer: COMMERCIAL

## 2022-11-17 ENCOUNTER — HOSPITAL ENCOUNTER (OUTPATIENT)
Facility: CLINIC | Age: 32
End: 2022-11-17
Admitting: OBSTETRICS & GYNECOLOGY
Payer: COMMERCIAL

## 2022-11-17 VITALS
HEART RATE: 98 BPM | RESPIRATION RATE: 18 BRPM | DIASTOLIC BLOOD PRESSURE: 73 MMHG | SYSTOLIC BLOOD PRESSURE: 116 MMHG | TEMPERATURE: 97.9 F

## 2022-11-17 DIAGNOSIS — B96.89 BACTERIAL VAGINOSIS: Primary | ICD-10-CM

## 2022-11-17 DIAGNOSIS — N76.0 BACTERIAL VAGINOSIS: Primary | ICD-10-CM

## 2022-11-17 LAB
ALBUMIN UR-MCNC: NEGATIVE MG/DL
APPEARANCE UR: CLEAR
BILIRUB UR QL STRIP: NEGATIVE
CLUE CELLS: PRESENT
COLOR UR AUTO: NORMAL
GLUCOSE UR STRIP-MCNC: NEGATIVE MG/DL
HGB UR QL STRIP: NEGATIVE
KETONES UR STRIP-MCNC: NEGATIVE MG/DL
LEUKOCYTE ESTERASE UR QL STRIP: NEGATIVE
NITRATE UR QL: NEGATIVE
PH UR STRIP: 6.5 [PH] (ref 5–7)
SP GR UR STRIP: 1 (ref 1–1.03)
TRICHOMONAS, WET PREP: ABNORMAL
UROBILINOGEN UR STRIP-MCNC: NORMAL MG/DL
WBC'S/HIGH POWER FIELD, WET PREP: ABNORMAL
YEAST, WET PREP: ABNORMAL

## 2022-11-17 PROCEDURE — 87653 STREP B DNA AMP PROBE: CPT

## 2022-11-17 PROCEDURE — G0463 HOSPITAL OUTPT CLINIC VISIT: HCPCS | Mod: 25

## 2022-11-17 PROCEDURE — 87591 N.GONORRHOEAE DNA AMP PROB: CPT

## 2022-11-17 PROCEDURE — 81003 URINALYSIS AUTO W/O SCOPE: CPT

## 2022-11-17 PROCEDURE — 87210 SMEAR WET MOUNT SALINE/INK: CPT

## 2022-11-17 PROCEDURE — 59025 FETAL NON-STRESS TEST: CPT

## 2022-11-17 RX ORDER — LIDOCAINE 40 MG/G
CREAM TOPICAL
Status: DISCONTINUED | OUTPATIENT
Start: 2022-11-17 | End: 2022-11-18 | Stop reason: HOSPADM

## 2022-11-17 RX ORDER — METRONIDAZOLE 500 MG/1
500 TABLET ORAL 2 TIMES DAILY
Qty: 14 TABLET | Refills: 0 | Status: SHIPPED | OUTPATIENT
Start: 2022-11-17 | End: 2022-11-24

## 2022-11-17 ASSESSMENT — ACTIVITIES OF DAILY LIVING (ADL)
ADLS_ACUITY_SCORE: 31
ADLS_ACUITY_SCORE: 31

## 2022-11-18 LAB
C TRACH DNA SPEC QL PROBE+SIG AMP: NEGATIVE
GP B STREP DNA SPEC QL NAA+PROBE: NEGATIVE
N GONORRHOEA DNA SPEC QL NAA+PROBE: NEGATIVE

## 2022-11-18 NOTE — DISCHARGE INSTRUCTIONS
Discharge Instruction for Undelivered Patients      You were seen for: Labor Assessment  We Consulted: Dr. Ramírez and Dr. Rojas  You had (Test or Medicine): labs and monitoring     Diet:   Drink 8 to 12 glasses of liquids (milk, juice, water) every day.  You may eat meals and snacks.     Activity:  Count fetal kicks everyday (see handout)  Call your doctor or nurse midwife if your baby is moving less than usual.     Call your provider if you notice:  Swelling in your face or increased swelling in your hands or legs.  Headaches that are not relieved by Tylenol (acetaminophen).  Changes in your vision (blurring: seeing spots or stars.)  Nausea (sick to your stomach) and vomiting (throwing up).   Weight gain of 5 pounds or more per week.  Heartburn that doesn't go away.  Signs of bladder infection: pain when you urinate (use the toilet), need to go more often and more urgently.  The bag of gutierrez (rupture of membranes) breaks, or you notice leaking in your underwear.  Bright red blood in your underwear.  Abdominal (lower belly) or stomach pain.  For first baby: Contractions (tightening) less than 5 minutes apart for one hour or more.  Second (plus) baby: Contractions (tightening) less than 10 minutes apart and getting stronger.  *If less than 34 weeks: Contractions (tightening) more than 6 times in one hour.  Increase or change in vaginal discharge (note the color and amount)  Other:     Follow-up:  As scheduled in the clinic

## 2022-11-18 NOTE — PLAN OF CARE
Data: [facility]   Reason for maternal/fetal assessment per patient is  Labor  . Pt c/o contractions q1-3min. Patient is a . Prenatal record reviewed.      OB History    Para Term  AB Living   3 1 1 0 1 1   SAB IAB Ectopic Multiple Live Births   0 0 0 0 1      # Outcome Date GA Lbr Jhony/2nd Weight Sex Delivery Anes PTL Lv   3 Current            2 AB            1 Term     M    MICHAEL      Medical History:   Past Medical History:   Diagnosis Date     Seasonal allergies    . Gestational Age 32w3d. VSS. Cervix: closed.  Fetal movement present. Patient denies vaginal discharge, pelvic pressure, UTI symptoms, GI problems, bloody show, vaginal bleeding, edema, headache, visual disturbances, epigastric or URQ pain, rupture of membranes.   Action: Verbal consent for EFM. Triage assessment completed. EFM applied upon arrival. Uterine assessment showed no contractions and none palpated by RN, but pt was marking her ctx q2-5min lasting 20-50seconds. Fetal assessment: Presumed adequate fetal oxygenation documented (see flow record).     Response: Swabs collected and sent to lab per orders. Initial SVE closed at 2100. Provider reassessed patient at bedside. Plan per provider is to discharge patient home. Patient education and discharge completed by oncoming DANETTE Saleem.

## 2022-11-18 NOTE — PROGRESS NOTES
L&D Triage Progress Note    HPI: Xin Ramirez is a 32 year old  at 32w3d, here for  labor evaluation. She states that she is feeling well today. She states that she has been intermittently feeling contractions over the past few hours. + FM, no VB, or LOF. She denies fever, chills, HA, scotoma, CP, SOB, nausea, vomiting,  RUQ pain, constipation, diarrhea, dysuria, and acute swelling.    Pregnancy notable for:  - large subchorionic hematoma at 12w US, followed by large vaginal bleed  - single umbilical artery  - Hx cystocele after last delivery, repaired    No results found for: ABO, RH, AS, HEPBANG, CHPCRT, GCPCRT, TREPAB, RPR, RUBELLAABIGG, HGB, HIV    GBS Status:   No results found for: GBS          OBHX:  OB History    Para Term  AB Living   3 1 1 0 1 1   SAB IAB Ectopic Multiple Live Births   0 0 0 0 1      # Outcome Date GA Lbr Jhony/2nd Weight Sex Delivery Anes PTL Lv   3 Current            2 AB            1 Term     M    MICHAEL       MedicalHX:  Past Medical History:   Diagnosis Date    Seasonal allergies        SurgicalHX:  Past Surgical History:   Procedure Laterality Date    APPENDECTOMY      tonsillectomy         Medications:  No current facility-administered medications on file prior to encounter.  doxylamine (UNISOM) 25 MG TABS tablet, Take 25 mg by mouth At Bedtime  ESTRIOL 1MG/GRAM CREAM, 1 g twice a week Apply one gram vaginally every night for two weeks then  Use twice weekly thereafter  levonorgestrel (MIRENA) 20 MCG/24HR IUD, 1 Device by Intrauterine route daily  Prenatal Vit-Iron Carbonyl-FA (VOL-TAB RX) 29-1 MG TABS, Take 1 tablet by mouth daily        Allergies:  Allergies   Allergen Reactions    Sulfa Drugs Hives       FamilyHX:      Family History   Problem Relation Age of Onset    Hyperlipidemia Mother     Stomach Cancer Maternal Grandmother     Skin Cancer Maternal Grandfather     Lung Cancer Paternal Grandfather        SocialHX:  Social History      Socioeconomic History    Marital status:    Tobacco Use    Smoking status: Never    Smokeless tobacco: Never   Substance and Sexual Activity    Alcohol use: Never    Drug use: Never    Sexual activity: Yes     Partners: Male     Birth control/protection: I.U.D.       ROS: 10-point ROS negative except as in HPI.    Physical Exam:  Vitals:    228   BP: 116/73   BP Location: Left arm   Patient Position: Semi-Augilar's   Cuff Size: Adult Regular   Pulse: 98   Resp: 18   Temp: 97.9  F (36.6  C)   TempSrc: Oral     GEN: resting comfortably in bed, NAD   CV: Regular rate, well perfused  PULM: On room air, no increased work of breathing  ABD: soft, gravid, non-tender, non-distended  EXT: trace edema, non tender to palpation  CVX: Closed    NST:  FHT: baseline 135, moderate variability, accels, no decels  TOCO: no ctx in ten minutes    Results for orders placed or performed during the hospital encounter of 22   UA reflex to Microscopic and Culture     Status: Normal    Specimen: Urine, Clean Catch   Result Value Ref Range    Color Urine Straw Colorless, Straw, Light Yellow, Yellow    Appearance Urine Clear Clear    Glucose Urine Negative Negative mg/dL    Bilirubin Urine Negative Negative    Ketones Urine Negative Negative mg/dL    Specific Gravity Urine 1.005 1.003 - 1.035    Blood Urine Negative Negative    pH Urine 6.5 5.0 - 7.0    Protein Albumin Urine Negative Negative mg/dL    Urobilinogen Urine Normal Normal, 2.0 mg/dL    Nitrite Urine Negative Negative    Leukocyte Esterase Urine Negative Negative    Narrative    Microscopic not indicated   Wet preparation     Status: Abnormal    Specimen: Vagina; Swab   Result Value Ref Range    Trichomonas Absent Absent    Yeast Absent Absent    Clue Cells Present (A) Absent    WBCs/high power field 2+ (A) None     GC/CT and GBS pending    A/P: 32 year old  at 32w3d, here for  labor rule out. Pregnancy notable for as above.    # Rule out   Labor: Patient is not in  labor  - Cervix: Closed  - Membranes: Intact  - Labs: UA reflex UC WNL, Clue cells on wet prep, GC/C, GBS pending  - Oral Hydration  - Will prescribe metronidazole 500mg BID x 7 days for Bacterial Vaginosis  - Patient is stable for discharge and agreeable to discharge.    # Fetal Well-Being  - Continuous Monitoring  - NST: FHT: Category 1    Patient discussed with Dr. Crystal Ramírez MD, MPH  Ob/Gyn Resident, PGY-2  22 2:12 AM    Staff MD Note    I appreciate the note by Dr. Ramírez.  Any necessary changes have been made by me.  I discussed the patient with the resident and agree with the findings and plan of care as documented in the note.    Xin Rojas MD

## 2023-07-13 NOTE — PLAN OF CARE
Pt cont to report some cramping but decreasing and none in last 30 minutes. FHR AGA.  at bedside.       home

## 2023-10-07 ENCOUNTER — HEALTH MAINTENANCE LETTER (OUTPATIENT)
Age: 33
End: 2023-10-07

## 2024-11-24 ENCOUNTER — HEALTH MAINTENANCE LETTER (OUTPATIENT)
Age: 34
End: 2024-11-24